# Patient Record
Sex: MALE | Race: WHITE | Employment: FULL TIME | URBAN - METROPOLITAN AREA
[De-identification: names, ages, dates, MRNs, and addresses within clinical notes are randomized per-mention and may not be internally consistent; named-entity substitution may affect disease eponyms.]

---

## 2017-07-27 ENCOUNTER — TELEPHONE (OUTPATIENT)
Dept: FAMILY MEDICINE | Facility: CLINIC | Age: 57
End: 2017-07-27

## 2017-07-27 NOTE — TELEPHONE ENCOUNTER
Reason for Call:  Medication or medication refill:    Do you use a [facility]?  Name of the pharmacy and phone number for the current request:  [id number] - [place], [place] - [address] AT [facility] [address]    Name of the medication requested: DIAMOX (ACETAZOLAMID) 125 MG    Other request: Patient is requesting an RX for the medication above. States he will be travelling on 8/10/2017 and this medication is used to manage altitude / symptoms that may occur in high altitudes (patient will be hiking, etc.). States he has used this many times in the past. Patient is aware that he has not been seen in two years, and states he plans to make an appointment for a physical soon. He is hoping this can be prescribed without a visit until then. Please assist, and keep the patient posted on the status. Thanks!    Can we leave a detailed message on this number? YES    Phone number patient can be reached at: Other phone number:  [phone]    Best Time: Any    Call taken on 7/27/2017 at 1:40 PM by Rabia Catherine

## 2017-07-27 NOTE — TELEPHONE ENCOUNTER
HP RECEPTION PLEASE CALL AND GET UPDATED INSURANCE INFO FROM PATIENT FOR AN UPCOMING AUGUST 3 VISIT WITH DR. WAHL. THANK YOU.    TRIAGE DISCUSSED REQUEST WITH PATIENT AND HE MADE A PHYSICAL APPT. > 24 MONTHS SINCE LAST VISIT.   Awa Hunter RN

## 2017-08-03 ENCOUNTER — OFFICE VISIT (OUTPATIENT)
Dept: FAMILY MEDICINE | Facility: CLINIC | Age: 57
End: 2017-08-03
Payer: COMMERCIAL

## 2017-08-03 VITALS
DIASTOLIC BLOOD PRESSURE: 74 MMHG | TEMPERATURE: 97.7 F | SYSTOLIC BLOOD PRESSURE: 114 MMHG | RESPIRATION RATE: 18 BRPM | OXYGEN SATURATION: 97 % | HEIGHT: 73 IN | HEART RATE: 66 BPM | WEIGHT: 182 LBS | BODY MASS INDEX: 24.12 KG/M2

## 2017-08-03 DIAGNOSIS — Z00.01 ENCOUNTER FOR ROUTINE ADULT MEDICAL EXAM WITH ABNORMAL FINDINGS: Primary | ICD-10-CM

## 2017-08-03 LAB
CHOLEST SERPL-MCNC: 229 MG/DL
GLUCOSE SERPL-MCNC: 98 MG/DL (ref 70–99)
HDLC SERPL-MCNC: 61 MG/DL
LDLC SERPL CALC-MCNC: 141 MG/DL
NONHDLC SERPL-MCNC: 168 MG/DL
TRIGL SERPL-MCNC: 137 MG/DL

## 2017-08-03 PROCEDURE — 80061 LIPID PANEL: CPT | Performed by: FAMILY MEDICINE

## 2017-08-03 PROCEDURE — 82947 ASSAY GLUCOSE BLOOD QUANT: CPT | Performed by: FAMILY MEDICINE

## 2017-08-03 PROCEDURE — 36415 COLL VENOUS BLD VENIPUNCTURE: CPT | Performed by: FAMILY MEDICINE

## 2017-08-03 PROCEDURE — 99396 PREV VISIT EST AGE 40-64: CPT | Performed by: FAMILY MEDICINE

## 2017-08-03 RX ORDER — ACETAZOLAMIDE 125 MG/1
125 TABLET ORAL 2 TIMES DAILY
Qty: 20 TABLET | Refills: 0 | Status: SHIPPED | OUTPATIENT
Start: 2017-08-03

## 2017-08-03 RX ORDER — CIPROFLOXACIN 500 MG/1
500 TABLET, FILM COATED ORAL 2 TIMES DAILY
Qty: 6 TABLET | Refills: 0 | Status: SHIPPED | OUTPATIENT
Start: 2017-08-03

## 2017-08-03 NOTE — MR AVS SNAPSHOT
After Visit Summary   8/3/2017    Tano Sims    MRN: [id number]           Patient Information     Date Of Birth          1960        Visit Information        Provider Department      8/3/2017 9:00 AM Jimmy Darling MD [facility]        Today's Diagnoses     Encounter for routine adult medical exam with abnormal findings    -  1      Care Instructions      Preventive Health Recommendations  Male Ages 50 - 64    Yearly exam:             See your health care provider every year in order to  o   Review health changes.   o   Discuss preventive care.    o   Review your medicines if your doctor has prescribed any.     Have a cholesterol test every 5 years, or more frequently if you are at risk for high cholesterol/heart disease.     Have a diabetes test (fasting glucose) every three years. If you are at risk for diabetes, you should have this test more often.     Have a colonoscopy at age 50, or have a yearly FIT test (stool test). These exams will check for colon cancer.      Talk with your health care provider about whether or not a prostate cancer screening test (PSA) is right for you.    You should be tested each year for STDs (sexually transmitted diseases), if you re at risk.     Shots: Get a flu shot each year. Get a tetanus shot every 10 years.     Nutrition:    Eat at least 5 servings of fruits and vegetables daily.     Eat whole-grain bread, whole-wheat pasta and brown rice instead of white grains and rice.     Talk to your provider about Calcium and Vitamin D.     Lifestyle    Exercise for at least 150 minutes a week (30 minutes a day, 5 days a week). This will help you control your weight and prevent disease.     Limit alcohol to one drink per day.     No smoking.     Wear sunscreen to prevent skin cancer.     See your dentist every six months for an exam and cleaning.     See your eye doctor every 1 to 2 years.            Follow-ups after your visit        Who to  "contact     If you have questions or need follow up information about today's clinic visit or your schedule please contact [facility] directly at [phone].  Normal or non-critical lab and imaging results will be communicated to you by MyChart, letter or phone within 4 business days after the clinic has received the results. If you do not hear from us within 7 days, please contact the clinic through [employer] or phone. If you have a critical or abnormal lab result, we will notify you by phone as soon as possible.  Submit refill requests through [employer] or call your pharmacy and they will forward the refill request to us. Please allow 3 business days for your refill to be completed.          Additional Information About Your Visit        Owlr Information     Owlr lets you send messages to your doctor, view your test results, renew your prescriptions, schedule appointments and more. To sign up, go to [email or web address] . Click on \"Log in\" on the left side of the screen, which will take you to the Welcome page. Then click on \"Sign up Now\" on the right side of the page.     You will be asked to enter the access code listed below, as well as some personal information. Please follow the directions to create your username and password.     Your access code is: [email or web address]  Expires: 2017  9:21 AM     Your access code will  in 90 days. If you need help or a new code, please call your [place] clinic or [phone].        Care EveryWhere ID     This is your Care EveryWhere ID. This could be used by other organizations to access your [place] medical records  [id number]        Your Vitals Were     Pulse Temperature Respirations Height Pulse Oximetry BMI (Body Mass Index)    66 97.7  F (36.5  C) (Oral) 18 6' 0.5\" (1.842 m) 97% 24.34 kg/m2       Blood Pressure from Last 3 Encounters:   17 114/74   02/24/15 124/72   14 120/71    Weight from Last 3 Encounters:   17 " 182 lb (82.6 kg)   02/24/15 186 lb (84.4 kg)   08/20/14 182 lb (82.6 kg)              We Performed the Following     Glucose     Lipid panel reflex to direct LDL          Today's Medication Changes          These changes are accurate as of: 8/3/17  9:21 AM.  If you have any questions, ask your nurse or doctor.               Start taking these medicines.        Dose/Directions    acetaZOLAMIDE 125 MG tablet   Commonly known as:  DIAMOX   Used for:  Encounter for routine adult medical exam with abnormal findings   Started by:  Jimmy Darling MD        Dose:  125 mg   Take 1 tablet (125 mg) by mouth 2 times daily Start the day before ascents and use for 3 days with each ascent.   Quantity:  20 tablet   Refills:  0       ciprofloxacin 500 MG tablet   Commonly known as:  CIPRO   Used for:  Encounter for routine adult medical exam with abnormal findings   Started by:  Jimmy Darling MD        Dose:  500 mg   Take 1 tablet (500 mg) by mouth 2 times daily   Quantity:  6 tablet   Refills:  0            Where to get your medicines      These medications were sent to [id number] - [place], [place] - [address] AT [facility] [address]  [address], [place] [address]     Phone:  [phone]     acetaZOLAMIDE 125 MG tablet    ciprofloxacin 500 MG tablet                Primary Care Provider    None Specified       No primary provider on file.        Equal Access to Services     JEFF GOLD AH: Stefania whitten Sokathy, waaxda luqadaha, qaybta kaalmada adeegyada, minda blake. So [facility] [phone].    ATENCIÓN: Si habla español, tiene a lzoano disposición servicios gratuitos de asistencia lingüística. Llame al [phone].    We comply with applicable federal civil rights laws and [place] laws. We do not discriminate on the basis of race, color, national origin, age, disability sex, sexual orientation or gender identity.            Thank you!     Thank you for choosing  [facility]  for your care. Our goal is always to provide you with excellent care. Hearing back from our patients is one way we can continue to improve our services. Please take a few minutes to complete the written survey that you may receive in the mail after your visit with us. Thank you!             Your Updated Medication List - Protect others around you: Learn how to safely use, store and throw away your medicines at www.disposemymeds.org.          This list is accurate as of: 8/3/17  9:21 AM.  Always use your most recent med list.                   Brand Name Dispense Instructions for use Diagnosis    acetaZOLAMIDE 125 MG tablet    DIAMOX    20 tablet    Take 1 tablet (125 mg) by mouth 2 times daily Start the day before ascents and use for 3 days with each ascent.    Encounter for routine adult medical exam with abnormal findings       ciprofloxacin 500 MG tablet    CIPRO    6 tablet    Take 1 tablet (500 mg) by mouth 2 times daily    Encounter for routine adult medical exam with abnormal findings       NO ACTIVE MEDICATIONS

## 2017-08-03 NOTE — LETTER
Tano Sims  [address] [place] [address]        August 8, 2017          Dear Bethany,    We are writing to inform you of your test results.    Your blood sugar is normal. There is no evidence of diabetes.     Your Total and LDL (bad) cholesterol levels are mildly elevated.   Diet and exercise changes are recommended to help lower these.     Dietary changes to avoid saturated fats, and include more unsaturated fats (such as olive oil), soy protein, omega three fatty acids (as found in supplements or fish), high fiber diet, and more nuts as protein may help bring down cholesterol.     Call or send us a bluebottlebiz message if you have questions.    Resulted Orders   Lipid panel reflex to direct LDL   Result Value Ref Range    Cholesterol 229 (H) <200 mg/dL      Comment:      Desirable:       <200 mg/dl    Triglycerides 137 <150 mg/dL      Comment:      Fasting specimen    HDL Cholesterol 61 >39 mg/dL    LDL Cholesterol Calculated 141 (H) <100 mg/dL      Comment:      Above desirable:  100-129 mg/dl   Borderline High:  130-159 mg/dL   High:             160-189 mg/dL   Very high:       >189 mg/dl      Non HDL Cholesterol 168 (H) <130 mg/dL      Comment:      Above Desirable:  130-159 mg/dl   Borderline high:  160-189 mg/dl   High:             190-219 mg/dl   Very high:       >219 mg/dl     Glucose   Result Value Ref Range    Glucose 98 70 - 99 mg/dL      Comment:      Fasting specimen       If you have any questions or concerns, please call the clinic at the number listed above.       Sincerely,        Jimmy Darling MD/nr

## 2017-08-03 NOTE — NURSING NOTE
"Chief Complaint   Patient presents with     Physical       Initial /74  Pulse 66  Temp 97.7  F (36.5  C) (Oral)  Resp 18  Ht 6' 0.5\" (1.842 m)  Wt 182 lb (82.6 kg)  SpO2 97%  BMI 24.34 kg/m2 Estimated body mass index is 24.34 kg/(m^2) as calculated from the following:    Height as of this encounter: 6' 0.5\" (1.842 m).    Weight as of this encounter: 182 lb (82.6 kg).  Medication Reconciliation: complete     Mercy Holbrook MA    "

## 2017-08-03 NOTE — PROGRESS NOTES
SUBJECTIVE:   CC: Tano Sims is an 56 year old male who presents for preventative health visit.     Healthy Habits:    Do you get at least three servings of calcium containing foods daily (dairy, green leafy vegetables, etc.)? yes    Amount of exercise or daily activities, outside of work: 6 day(s) per week    Problems taking medications regularly No    Medication side effects: No    Have you had an eye exam in the past two years? no    Do you see a dentist twice per year? yes    Do you have sleep apnea, excessive snoring or daytime drowsiness?no    Will b e traveling      -------------------------------------    Today's PHQ-2 Score: PHQ-2 ( 1999 Pfizer) 2/24/2015 8/20/2014   Q1: Little interest or pleasure in doing things 0 0   Q2: Feeling down, depressed or hopeless 0 0   PHQ-2 Score 0 0         Abuse: Current or Past(Physical, Sexual or Emotional)- No  Do you feel safe in your environment - Yes  Social History   Substance Use Topics     Smoking status: Never Smoker     Smokeless tobacco: Never Used     Alcohol use Yes      Comment: recreational alcohol use          Standardized Alcohol Screening Questionnaire  AUDIT   Questions 0 1 2 3 4 Score   1. How often do you have a drink  containing alcohol? Never Monthly or less 2 to 4  times a  month 2 to 3  times a  week 4 or more  times a  week  4   2. How many drinks containing alcohol  do you have on a typical day when you are drinking? 1 or 2 3 or 4 5 or 6 7 to 9 10 or more  0   3. How often do you have more than five  or more drinks on one occasion? Never Less  than  monthly Monthly Weekly Daily or  almost  daily  0   4. How often during the last year have  you found that you were not able to stop drinking once you had started? Never Less  than  monthly Monthly Weekly Daily or  almost  daily  0   5. How often during the last year have  you failed to do what was normally expected of you because of drinking? Never Less  than  monthly Monthly Weekly Daily  or  almost  daily  0   6. How often during the last year have  you needed a first drink in the morning to get yourself going after a heavy drinking session? Never Less  than  monthly Monthly Weekly Daily or  almost  daily  0   7. How often during the last year have you had a feeling of guilt or remorse after drinking? Never Less  than  monthly Monthly Weekly Daily or  almost  daily  0   8. How often during the last year have  you been unable to remember what happened the night before because of your drinking? Never Less  than  monthly Monthly Weekly Daily or  almost  daily  0   9. Have you or someone else been  injured because of your drinking? No  Yes, but not in the last year  Yes,  during the  last year  0   10. Has a relative, friend, doctor or other health care worker been concerned about your drinking or suggested you cut down? No  Yes, but not in the last year  Yes,  during the  last year  0   Total  4   Scoring: A score of 7 for adult men is an indication of hazardous drinking (risk for physical or physiological harm); a score of 8 or more is an indication of an alcohol use disorder. A score of 5 or more for adult women  is an indication of hazardous drinking or an alcohol use disorder.         Last PSA: No results found for: PSA    Reviewed orders with patient. Reviewed health maintenance and updated orders accordingly - Yes  Labs reviewed in EPIC    Reviewed and updated as needed this visit by clinical staff         Reviewed and updated as needed this visit by Provider          ROS:  C: NEGATIVE for fever, chills, change in weight  I: NEGATIVE for worrisome rashes, moles or lesions  E: NEGATIVE for vision changes or irritation  ENT: NEGATIVE for ear, mouth and throat problems  R: NEGATIVE for significant cough or SOB  CV: NEGATIVE for chest pain, palpitations or peripheral edema  GI: NEGATIVE for nausea, abdominal pain, heartburn, or change in bowel habits   male: negative for dysuria, hematuria,  "decreased urinary stream, erectile dysfunction, urethral discharge  M: NEGATIVE for significant arthralgias or myalgia  N: NEGATIVE for weakness, dizziness or paresthesias  P: NEGATIVE for changes in mood or affect    OBJECTIVE:   /74  Pulse 66  Temp 97.7  F (36.5  C) (Oral)  Resp 18  Ht 6' 0.5\" (1.842 m)  Wt 182 lb (82.6 kg)  SpO2 97%  BMI 24.34 kg/m2  EXAM:  GENERAL: healthy, alert and no distress  EYES: Eyes grossly normal to inspection, PERRL and conjunctivae and sclerae normal  HENT: ear canals and TM's normal, nose and mouth without ulcers or lesions  NECK: no adenopathy, no asymmetry, masses, or scars and thyroid normal to palpation  RESP: lungs clear to auscultation - no rales, rhonchi or wheezes  CV: regular rate and rhythm, normal S1 S2, no S3 or S4, no murmur, click or rub, no peripheral edema and peripheral pulses strong  ABDOMEN: soft, nontender, no hepatosplenomegaly, no masses and bowel sounds normal  MS: no gross musculoskeletal defects noted, no edema  SKIN: no suspicious lesions or rashes  NEURO: Normal strength and tone, mentation intact and speech normal  PSYCH: mentation appears normal, affect normal/bright    ASSESSMENT/PLAN:       ICD-10-CM    1. Encounter for routine adult medical exam with abnormal findings Z00.01 Lipid panel reflex to direct LDL     Glucose     acetaZOLAMIDE (DIAMOX) 125 MG tablet     ciprofloxacin (CIPRO) 500 MG tablet   discussed travel risks and altitude. Discussed travel insurance. Discussed hep a he has had this previously.     COUNSELING:  Reviewed preventive health counseling, as reflected in patient instructions       Regular exercise       Healthy diet/nutrition       Colon cancer screening       Prostate cancer screening    BP Screening:   Last 3 BP Readings:    BP Readings from Last 3 Encounters:   08/03/17 114/74   02/24/15 124/72   08/20/14 120/71       The following was recommended to the patient:  Referral to alternative/primary care " "provider       reports that he has never smoked. He has never used smokeless tobacco.      Estimated body mass index is 24.34 kg/(m^2) as calculated from the following:    Height as of this encounter: 6' 0.5\" (1.842 m).    Weight as of this encounter: 182 lb (82.6 kg).         Counseling Resources:  ATP IV Guidelines  Pooled Cohorts Equation Calculator  FRAX Risk Assessment  ICSI Preventive Guidelines  Dietary Guidelines for Americans, 2010  USDA's MyPlate  ASA Prophylaxis  Lung CA Screening    Jimmy Darling MD  [facility]  "

## 2021-04-29 NOTE — TELEPHONE ENCOUNTER
DIAGNOSIS: Bilateral knee pain   APPOINTMENT DATE: 4.30.21   NOTES STATUS DETAILS   OFFICE NOTE from referring provider N/A    OFFICE NOTE from other specialist N/A    DISCHARGE SUMMARY from hospital N/A    DISCHARGE REPORT from the ER N/A    OPERATIVE REPORT N/A    EMG report N/A    MEDICATION LIST Internal    MRI N/A    DEXA (osteoporosis/bone health) N/A    CT SCAN N/A    XRAYS (IMAGES & REPORTS) N/A

## 2021-04-30 ENCOUNTER — OFFICE VISIT (OUTPATIENT)
Dept: ORTHOPEDICS | Facility: CLINIC | Age: 61
End: 2021-04-30
Payer: COMMERCIAL

## 2021-04-30 ENCOUNTER — PRE VISIT (OUTPATIENT)
Dept: ORTHOPEDICS | Facility: CLINIC | Age: 61
End: 2021-04-30

## 2021-04-30 ENCOUNTER — ANCILLARY PROCEDURE (OUTPATIENT)
Dept: GENERAL RADIOLOGY | Facility: CLINIC | Age: 61
End: 2021-04-30
Attending: FAMILY MEDICINE
Payer: COMMERCIAL

## 2021-04-30 VITALS — BODY MASS INDEX: 24.34 KG/M2 | WEIGHT: 182 LBS

## 2021-04-30 DIAGNOSIS — M25.562 CHRONIC PAIN OF BOTH KNEES: Primary | ICD-10-CM

## 2021-04-30 DIAGNOSIS — M25.561 CHRONIC PAIN OF BOTH KNEES: Primary | ICD-10-CM

## 2021-04-30 DIAGNOSIS — G89.29 CHRONIC PAIN OF BOTH KNEES: Primary | ICD-10-CM

## 2021-04-30 PROCEDURE — 99203 OFFICE O/P NEW LOW 30 MIN: CPT | Performed by: FAMILY MEDICINE

## 2021-04-30 PROCEDURE — 73562 X-RAY EXAM OF KNEE 3: CPT | Mod: LT | Performed by: RADIOLOGY

## 2021-04-30 NOTE — PROGRESS NOTES
[facility]  SPORTS & ORTHOPEDIC CLINIC VISIT     Apr 30, 2021        ASSESSMENT & PLAN    60-year-old with bilateral knee pain most consistent with patellofemoral etiology and chronic distal quadriceps tendinopathy.    Reviewed imaging and assessment with patient in detail  Discussed options for treatment including avoidance of aggravating activities while the condition is currently painful.  Otherwise continue as tolerated  Discussed use of oral and topical medications OTC  Provided with home exercises and provided referral to physical therapy.  If is not improving in 6 weeks he will follow-up in clinic    Martin Andujar MD  [facility] [place]    -----  Chief Complaint   Patient presents with     Right Knee - Pain     Left Knee - Pain       SUBJECTIVE  Tano Sims is a/an 60 year old male who is seen as a self referral for evaluation of  Bilateral knee pain.     The patient is seen by themselves.  The patient is Right handed    Onset: 5  month(s) ago. Patient describes that he has been very active with hiking, skiing, running, walking. Both knees have very painful to walk. At one point he was unable to walk without a limp. Pain was superior knee and medial and later as well.   Location of Pain: Anterior and superior knee  Worsened by: Being active, deep knee flexion exercises  Better with: ice, CBD,   Treatments tried: Ice, Rest, CBD cream   Associated symptoms: pain    Orthopedic/Surgical history: NO  Social History/Occupation: No       REVIEW OF SYSTEMS:    Do you have fever, chills, weight loss? No    Do you have any vision problems? No    Do you have any chest pain or edema? No    Do you have any shortness of breath or wheezing?  No    Do you have stomach problems? No    Do you have any numbness or focal weakness? No    Do you have diabetes? No    Do you have problems with bleeding or clotting? No    Do you have an rashes or other skin lesions?  No    OBJECTIVE:  There were no vitals taken for this visit.     Patient is alert, No acute distress, pleasant and conversational.    Gait: nonantalgic. Normal heel toe gait.    bilateral knee:   Skin intact. No erythema or ecchymosis.  No effusion or soft tissue swelling.    AROM: Zero to approximately 135  pain on terminal flexion    Palpation:   TTP of the superior pole the patella bilaterally  No medial or lateral facet joint tenderness.  No posterior medial or posterior lateral joint line tenderness     Special Tests:  Negative bounce test,+forced flexion and negative Phani's.  No ligamentous laxity or pain with valgus or varus stress.  Negative Lachman's, Anterior Drawer and Posterior Drawer     Full Isometric quad strength, extensor mechanism in place     Neurovascularly intact in the lower extremity    Hip and Ankle with full AROM and nontender      RADIOLOGY:    3 view xrays of bilateral knee performed and reviewed independently demonstrating enthesophyte on the superior pole the patella and quad insertion bilaterally.  No significant degenerative change otherwise. See EMR for formal radiology report.

## 2021-04-30 NOTE — LETTER
Date:May 10, 2021      Patient was self referred, no letter generated. Do not send.        [facility] Health Information

## 2021-04-30 NOTE — LETTER
4/30/2021         RE: Tano Sims  [address]  [place] [address]        Dear Colleague,    Thank you for referring your patient, Tano Sims, to the [facility]. Please see a copy of my visit note below.      [facility]  SPORTS & ORTHOPEDIC CLINIC VISIT     Apr 30, 2021        ASSESSMENT & PLAN    60-year-old with bilateral knee pain most consistent with patellofemoral etiology and chronic distal quadriceps tendinopathy.    Reviewed imaging and assessment with patient in detail  Discussed options for treatment including avoidance of aggravating activities while the condition is currently painful.  Otherwise continue as tolerated  Discussed use of oral and topical medications OTC  Provided with home exercises and provided referral to physical therapy.  If is not improving in 6 weeks he will follow-up in clinic    Martin Andujar MD  [facility]    -----  Chief Complaint   Patient presents with     Right Knee - Pain     Left Knee - Pain       SUBJECTIVE  Tano Sims is a/an 60 year old male who is seen as a self referral for evaluation of  Bilateral knee pain.     The patient is seen by themselves.  The patient is Right handed    Onset: 5  month(s) ago. Patient describes that he has been very active with hiking, skiing, running, walking. Both knees have very painful to walk. At one point he was unable to walk without a limp. Pain was superior knee and medial and later as well.   Location of Pain: Anterior and superior knee  Worsened by: Being active, deep knee flexion exercises  Better with: ice, CBD,   Treatments tried: Ice, Rest, CBD cream   Associated symptoms: pain    Orthopedic/Surgical history: NO  Social History/Occupation: No       REVIEW OF SYSTEMS:    Do you have fever, chills, weight loss? No    Do you have any vision problems? No    Do you have any chest pain or edema? No    Do  you have any shortness of breath or wheezing?  No    Do you have stomach problems? No    Do you have any numbness or focal weakness? No    Do you have diabetes? No    Do you have problems with bleeding or clotting? No    Do you have an rashes or other skin lesions? No    OBJECTIVE:  There were no vitals taken for this visit.     Patient is alert, No acute distress, pleasant and conversational.    Gait: nonantalgic. Normal heel toe gait.    bilateral knee:   Skin intact. No erythema or ecchymosis.  No effusion or soft tissue swelling.    AROM: Zero to approximately 135  pain on terminal flexion    Palpation:   TTP of the superior pole the patella bilaterally  No medial or lateral facet joint tenderness.  No posterior medial or posterior lateral joint line tenderness     Special Tests:  Negative bounce test,+forced flexion and negative Phani's.  No ligamentous laxity or pain with valgus or varus stress.  Negative Lachman's, Anterior Drawer and Posterior Drawer     Full Isometric quad strength, extensor mechanism in place     Neurovascularly intact in the lower extremity    Hip and Ankle with full AROM and nontender      RADIOLOGY:    3 view xrays of bilateral knee performed and reviewed independently demonstrating enthesophyte on the superior pole the patella and quad insertion bilaterally.  No significant degenerative change otherwise. See EMR for formal radiology report.                  Again, thank you for allowing me to participate in the care of your patient.        Sincerely,        Martin Andujar MD

## 2021-06-12 ENCOUNTER — HEALTH MAINTENANCE LETTER (OUTPATIENT)
Age: 61
End: 2021-06-12

## 2021-10-02 ENCOUNTER — HEALTH MAINTENANCE LETTER (OUTPATIENT)
Age: 61
End: 2021-10-02

## 2022-07-09 ENCOUNTER — HEALTH MAINTENANCE LETTER (OUTPATIENT)
Age: 62
End: 2022-07-09

## 2022-09-03 ENCOUNTER — HEALTH MAINTENANCE LETTER (OUTPATIENT)
Age: 62
End: 2022-09-03

## 2023-07-22 ENCOUNTER — HEALTH MAINTENANCE LETTER (OUTPATIENT)
Age: 63
End: 2023-07-22